# Patient Record
Sex: MALE | URBAN - METROPOLITAN AREA
[De-identification: names, ages, dates, MRNs, and addresses within clinical notes are randomized per-mention and may not be internally consistent; named-entity substitution may affect disease eponyms.]

---

## 2021-01-07 PROBLEM — Z00.00 ENCOUNTER FOR PREVENTIVE HEALTH EXAMINATION: Status: ACTIVE | Noted: 2021-01-07

## 2021-01-27 ENCOUNTER — OUTPATIENT (OUTPATIENT)
Dept: OUTPATIENT SERVICES | Facility: HOSPITAL | Age: 61
LOS: 1 days | End: 2021-01-27
Payer: COMMERCIAL

## 2021-01-27 ENCOUNTER — APPOINTMENT (OUTPATIENT)
Dept: CARDIOLOGY | Facility: CLINIC | Age: 61
End: 2021-01-27
Payer: COMMERCIAL

## 2021-01-27 ENCOUNTER — APPOINTMENT (OUTPATIENT)
Dept: CV DIAGNOSITCS | Facility: HOSPITAL | Age: 61
End: 2021-01-27

## 2021-01-27 ENCOUNTER — NON-APPOINTMENT (OUTPATIENT)
Age: 61
End: 2021-01-27

## 2021-01-27 VITALS — DIASTOLIC BLOOD PRESSURE: 71 MMHG | SYSTOLIC BLOOD PRESSURE: 103 MMHG | HEART RATE: 73 BPM | OXYGEN SATURATION: 99 %

## 2021-01-27 DIAGNOSIS — R06.00 DYSPNEA, UNSPECIFIED: ICD-10-CM

## 2021-01-27 DIAGNOSIS — Z00.00 ENCOUNTER FOR GENERAL ADULT MEDICAL EXAMINATION WITHOUT ABNORMAL FINDINGS: ICD-10-CM

## 2021-01-27 DIAGNOSIS — R06.02 SHORTNESS OF BREATH: ICD-10-CM

## 2021-01-27 DIAGNOSIS — I42.2 OTHER HYPERTROPHIC CARDIOMYOPATHY: ICD-10-CM

## 2021-01-27 DIAGNOSIS — Q24.8 OTHER SPECIFIED CONGENITAL MALFORMATIONS OF HEART: ICD-10-CM

## 2021-01-27 PROCEDURE — 93000 ELECTROCARDIOGRAM COMPLETE: CPT

## 2021-01-27 PROCEDURE — 99072 ADDL SUPL MATRL&STAF TM PHE: CPT

## 2021-01-27 PROCEDURE — 93306 TTE W/DOPPLER COMPLETE: CPT | Mod: 26

## 2021-01-27 PROCEDURE — 99205 OFFICE O/P NEW HI 60 MIN: CPT

## 2021-01-27 PROCEDURE — 93306 TTE W/DOPPLER COMPLETE: CPT

## 2021-01-27 RX ORDER — VERAPAMIL HYDROCHLORIDE 120 MG/1
120 TABLET ORAL
Qty: 90 | Refills: 3 | Status: ACTIVE | COMMUNITY
Start: 2021-01-27 | End: 1900-01-01

## 2021-02-16 PROBLEM — R06.00 DYSPNEA: Status: ACTIVE | Noted: 2021-02-16

## 2021-02-16 PROBLEM — R06.02 SHORTNESS OF BREATH: Status: ACTIVE | Noted: 2021-02-16

## 2021-02-16 NOTE — REASON FOR VISIT
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

## 2021-02-16 NOTE — DISCUSSION/SUMMARY
[FreeTextEntry1] : Mr. Garrido is a 60 yr old gentleman with HCM phenotypically, unclear if genetic hypertrophic cardiomyopathy, per records has some limitations ( exercise post meals). Has abnormal stress test with significant exercise limitations. Patient has been followed for many years in Health system and presentred now for second opinion in regards to worsening symptoms - SOB/chest tightness and leg tightness upon minimal exrtion. In the past, patient was advised to start on Lopressor (which he could not tolerate due to the ED) and the verapamil - both of which he was not 100% compliant with. At a certain point, he was advised to consider surgery as well. Zaire has been hesitant\par - the echo that was done today was reviewed at length with the zaire - very concerning regarding his elevated intracavitary gradient especially upon valsalva\par - BP stable. Encouraged the patient to monitor blood pressure at home, keep a log, and report results back to us for evaluation. Based on results, we will adjust the regimen as necessary.\par - ECG with no acute ischemic changes (stable from prior)\par - discussed with zaire at length regarding needing to be  on medical therapy before any further surgical approach should be considered. WIll be started on verapamil\par - if patient takes the meds, and with being compliant, still has discomfort, would advise to pursue further ischemic evaluation - including a cafrdiac cath to rule out CAD as the cause prior to assuming it is in setting of the HOCM\par - Encouraged patient to continue healthy exercise and eating habits, focusing on a Mediterranean style of eating and aiming for the recommended 150 minutes per week of moderate physical activity.\par \par \par

## 2021-02-16 NOTE — HISTORY OF PRESENT ILLNESS
[FreeTextEntry1] : Mr. Garrido is a 60 yr old gentleman with HCM phenotypically, unclear if genetic hypertrophic cardiomyopathy, per records has some limitations ( exercise post meals). Has abnormal stress test with significant exercise limitations. Patient has been followed for many years in [facility] and presentred now for second opinion in regards to worsening symptoms - SOB/chest tightness and leg tightness upon minimal exrtion. In the past, patient was advised to start on Lopressor (which he could not tolerate due to the ED) and the verapamil - both of which he was not 100% compliant with. At a certain point, he was advised to consider surgery as well. Zaire has been hesitant\par \par Family history - Father  alive 91 yrs\par Mom -  at 67 with CVA \par SIblings - one brother ( not tested for HOCM )\par Children - Has 2 children in their teens - Normal echo no evidence of HOCM

## 2021-02-16 NOTE — REVIEW OF SYSTEMS
[Shortness Of Breath] : shortness of breath [Chest  Pressure] : chest pressure [Dyspnea on exertion] : dyspnea during exertion [Leg Claudication] : intermittent leg claudication [Negative] : Heme/Lymph

## 2021-02-16 NOTE — PHYSICAL EXAM
[General Appearance - Well Developed] : well developed [Normal Appearance] : normal appearance [Well Groomed] : well groomed [General Appearance - Well Nourished] : well nourished [No Deformities] : no deformities [General Appearance - In No Acute Distress] : no acute distress [Normal Conjunctiva] : the conjunctiva exhibited no abnormalities [Eyelids - No Xanthelasma] : the eyelids demonstrated no xanthelasmas [Normal Oral Mucosa] : normal oral mucosa [No Oral Pallor] : no oral pallor [No Oral Cyanosis] : no oral cyanosis [Normal Jugular Venous A Waves Present] : normal jugular venous A waves present [Normal Jugular Venous V Waves Present] : normal jugular venous V waves present [No Jugular Venous Olea A Waves] : no jugular venous olea A waves [Heart Rate And Rhythm] : heart rate and rhythm were normal [Heart Sounds] : normal S1 and S2 [Murmurs] : no murmurs present [Respiration, Rhythm And Depth] : normal respiratory rhythm and effort [Exaggerated Use Of Accessory Muscles For Inspiration] : no accessory muscle use [Auscultation Breath Sounds / Voice Sounds] : lungs were clear to auscultation bilaterally [Abdomen Soft] : soft [Abdomen Tenderness] : non-tender [Abdomen Mass (___ Cm)] : no abdominal mass palpated [Abnormal Walk] : normal gait [Gait - Sufficient For Exercise Testing] : the gait was sufficient for exercise testing [Nail Clubbing] : no clubbing of the fingernails [Petechial Hemorrhages (___cm)] : no petechial hemorrhages [Cyanosis, Localized] : no localized cyanosis [Skin Color & Pigmentation] : normal skin color and pigmentation [] : no rash [No Venous Stasis] : no venous stasis [Skin Lesions] : no skin lesions [No Skin Ulcers] : no skin ulcer [No Xanthoma] : no  xanthoma was observed [Oriented To Time, Place, And Person] : oriented to person, place, and time [Affect] : the affect was normal [Mood] : the mood was normal [No Anxiety] : not feeling anxious

## 2021-03-04 ENCOUNTER — FORM ENCOUNTER (OUTPATIENT)
Age: 61
End: 2021-03-04

## 2021-03-05 ENCOUNTER — TRANSCRIPTION ENCOUNTER (OUTPATIENT)
Age: 61
End: 2021-03-05

## 2021-03-06 ENCOUNTER — OUTPATIENT (OUTPATIENT)
Dept: INPATIENT UNIT | Facility: HOSPITAL | Age: 61
LOS: 1 days | Discharge: HOME | End: 2021-03-06

## 2021-03-06 ENCOUNTER — APPOINTMENT (OUTPATIENT)
Dept: DISASTER EMERGENCY | Facility: CLINIC | Age: 61
End: 2021-03-06

## 2021-03-06 VITALS
TEMPERATURE: 98 F | RESPIRATION RATE: 18 BRPM | SYSTOLIC BLOOD PRESSURE: 117 MMHG | DIASTOLIC BLOOD PRESSURE: 67 MMHG | OXYGEN SATURATION: 95 % | HEART RATE: 78 BPM

## 2021-03-06 VITALS
SYSTOLIC BLOOD PRESSURE: 118 MMHG | RESPIRATION RATE: 16 BRPM | HEART RATE: 73 BPM | OXYGEN SATURATION: 97 % | DIASTOLIC BLOOD PRESSURE: 61 MMHG | TEMPERATURE: 98 F

## 2021-03-06 DIAGNOSIS — U07.1 COVID-19: ICD-10-CM

## 2021-03-06 RX ORDER — SODIUM CHLORIDE 9 MG/ML
250 INJECTION INTRAMUSCULAR; INTRAVENOUS; SUBCUTANEOUS
Refills: 0 | Status: DISCONTINUED | OUTPATIENT
Start: 2021-03-06 | End: 2021-03-06

## 2021-03-06 RX ORDER — BAMLANIVIMAB 35 MG/ML
700 INJECTION, SOLUTION INTRAVENOUS ONCE
Refills: 0 | Status: COMPLETED | OUTPATIENT
Start: 2021-03-06 | End: 2021-03-06

## 2021-03-06 RX ADMIN — BAMLANIVIMAB 270 MILLIGRAM(S): 35 INJECTION, SOLUTION INTRAVENOUS at 09:10

## 2021-03-06 NOTE — CHART NOTE - NSCHARTNOTEFT_GEN_A_CORE
CC: Monoclonal Antibody Infusion/COVID 19 Positive  60yMale    Pt is a 59 yo male, COVID + 3/4. Symptoms began with cough and sore throat    PMH: None    MEDS: None    ALLERGIES: None    HT/WT: 6'1" 215    exam/findings:  T(C): --  HR: --  BP: --  RR: --  SpO2: --      PE:   Appearance: NAD	  HEENT:   Normal oral mucosa,   Lymphatic: No lymphadenopathy  Cardiovascular: Normal S1 S2, No JVD, No murmurs, No edema  Respiratory: Lungs clear to auscultation	  Gastrointestinal:  Soft, Non-tender, + BS	  Skin: warm and dry  Neurologic: Non-focal  Extremities: Normal range of motion,    ASSESSMENT:  Pt is a 59 yo male, Covid + 3/4 referred by Dr. Bruce who presents to infusion center for Monoclonal antibody infusion (Bamlanivimab)  Symptoms/ Criteria: cough, sore throat  Risk Profile includes: high BMI    PLAN:  - infusion procedure explained to patient   - Consent for monoclonal antibody infusion obtained   - Risk & benefits discussed/all questions answered   - infuse  Bamlanivimab 700mg  IV over one hour   - observe patient for one hour post infusion   - pt was discharged in stable condition.  - pt tolerated the procedure well.  - pt was instructed to continue quarantine and hand hygiene  - pt was instructed to go to the emergency department if they experience difficulty breathing, shortness of breath, fever over 100.4 and/or pulse ox under 94%    Jillian D'Amico, PA-C CC: Monoclonal Antibody Infusion/COVID 19 Positive  60yMale    Pt is a 61 yo male, COVID + 3/4. Symptoms began with cough and sore throat    PMH: None    MEDS: None    ALLERGIES: None    HT/WT: 6'1" 215    exam/findings:  Vital Signs Last 24 Hrs  T(C): 36.6 (06 Mar 2021 10:10), Max: 36.9 (06 Mar 2021 09:40)  T(F): 97.8 (06 Mar 2021 10:10), Max: 98.4 (06 Mar 2021 09:40)  HR: 69 (06 Mar 2021 10:10) (69 - 74)  BP: 120/76 (06 Mar 2021 10:10) (100/74 - 120/76)  BP(mean): --  RR: 18 (06 Mar 2021 10:10) (16 - 18)  SpO2: 97% (06 Mar 2021 10:10) (96% - 97%)      PE:   Appearance: NAD	  HEENT:   Normal oral mucosa,   Lymphatic: No lymphadenopathy  Cardiovascular: Normal S1 S2, No JVD, No murmurs, No edema  Respiratory: Lungs clear to auscultation	  Gastrointestinal:  Soft, Non-tender, + BS	  Skin: warm and dry  Neurologic: Non-focal  Extremities: Normal range of motion,    ASSESSMENT:  Pt is a 61 yo male, Covid + 3/4 referred by Dr. Bruce who presents to infusion center for Monoclonal antibody infusion (Bamlanivimab)  Symptoms/ Criteria: cough, sore throat  Risk Profile includes: high BMI    PLAN:  - infusion procedure explained to patient   - Consent for monoclonal antibody infusion obtained   - Risk & benefits discussed/all questions answered   - infuse  Bamlanivimab 700mg  IV over one hour   - observe patient for one hour post infusion   - pt was discharged in stable condition.  - pt tolerated the procedure well.  - pt was instructed to continue quarantine and hand hygiene  - pt was instructed to go to the emergency department if they experience difficulty breathing, shortness of breath, fever over 100.4 and/or pulse ox under 94%    Jillian D'Amico, PA-C CC: Monoclonal Antibody Infusion/COVID 19 Positive  60yMale    Pt is a 59 yo male, COVID + 3/4. Symptoms began with cough and sore throat    PMH: cardiomyopathy    MEDS: None    ALLERGIES: None    HT/WT: 6'1" 215    exam/findings:  Vital Signs Last 24 Hrs  T(C): 36.6 (06 Mar 2021 10:10), Max: 36.9 (06 Mar 2021 09:40)  T(F): 97.8 (06 Mar 2021 10:10), Max: 98.4 (06 Mar 2021 09:40)  HR: 69 (06 Mar 2021 10:10) (69 - 74)  BP: 120/76 (06 Mar 2021 10:10) (100/74 - 120/76)  BP(mean): --  RR: 18 (06 Mar 2021 10:10) (16 - 18)  SpO2: 97% (06 Mar 2021 10:10) (96% - 97%)      PE:   Appearance: NAD	  HEENT:   Normal oral mucosa,   Lymphatic: No lymphadenopathy  Cardiovascular: Normal S1 S2, No JVD, No murmurs, No edema  Respiratory: Lungs clear to auscultation	  Gastrointestinal:  Soft, Non-tender, + BS	  Skin: warm and dry  Neurologic: Non-focal  Extremities: Normal range of motion,    ASSESSMENT:  Pt is a 59 yo male, Covid + 3/4 referred by Dr. Bruce who presents to infusion center for Monoclonal antibody infusion (Bamlanivimab)  Symptoms/ Criteria: cough, sore throat  Risk Profile includes: cardiomyopathy    PLAN:  - infusion procedure explained to patient   - Consent for monoclonal antibody infusion obtained   - Risk & benefits discussed/all questions answered   - infuse  Bamlanivimab 700mg  IV over one hour   - observe patient for one hour post infusion   - pt was discharged in stable condition.  - pt tolerated the procedure well.  - pt was instructed to continue quarantine and hand hygiene  - pt was instructed to go to the emergency department if they experience difficulty breathing, shortness of breath, fever over 100.4 and/or pulse ox under 94%    Jillian D'Amico, PA-C

## 2021-03-07 ENCOUNTER — TRANSCRIPTION ENCOUNTER (OUTPATIENT)
Age: 61
End: 2021-03-07

## 2021-04-12 ENCOUNTER — APPOINTMENT (OUTPATIENT)
Dept: CARDIOLOGY | Facility: CLINIC | Age: 61
End: 2021-04-12

## 2021-06-09 ENCOUNTER — NON-APPOINTMENT (OUTPATIENT)
Age: 61
End: 2021-06-09

## 2021-06-09 PROCEDURE — 99443: CPT

## 2021-06-28 ENCOUNTER — APPOINTMENT (OUTPATIENT)
Dept: MRI IMAGING | Facility: CLINIC | Age: 61
End: 2021-06-28
Payer: COMMERCIAL

## 2021-06-28 ENCOUNTER — OUTPATIENT (OUTPATIENT)
Dept: OUTPATIENT SERVICES | Facility: HOSPITAL | Age: 61
LOS: 1 days | End: 2021-06-28

## 2021-06-28 PROCEDURE — 75561 CARDIAC MRI FOR MORPH W/DYE: CPT | Mod: 26

## 2021-06-28 PROCEDURE — 75565 CARD MRI VELOC FLOW MAPPING: CPT | Mod: 26
